# Patient Record
Sex: FEMALE | Race: WHITE | NOT HISPANIC OR LATINO | Employment: UNEMPLOYED | URBAN - METROPOLITAN AREA
[De-identification: names, ages, dates, MRNs, and addresses within clinical notes are randomized per-mention and may not be internally consistent; named-entity substitution may affect disease eponyms.]

---

## 2018-01-18 PROBLEM — M25.562 ACUTE PAIN OF LEFT KNEE: Status: ACTIVE | Noted: 2018-01-18

## 2018-02-12 PROBLEM — R26.2 DIFFICULTY WALKING: Status: ACTIVE | Noted: 2018-02-12

## 2018-02-12 PROBLEM — R29.898 WEAKNESS OF BOTH HIPS: Status: ACTIVE | Noted: 2018-02-12

## 2018-02-12 PROBLEM — M25.561 ACUTE PAIN OF BOTH KNEES: Status: ACTIVE | Noted: 2018-01-18

## 2018-02-12 PROBLEM — Z98.890 S/P ARTHROSCOPY OF RIGHT KNEE: Status: ACTIVE | Noted: 2018-02-12

## 2018-07-27 ENCOUNTER — HOSPITAL ENCOUNTER (OUTPATIENT)
Dept: RADIOLOGY | Facility: MEDICAL CENTER | Age: 43
End: 2018-07-27

## 2018-07-27 ENCOUNTER — TELEPHONE (OUTPATIENT)
Dept: PHYSICAL MEDICINE AND REHAB | Facility: MEDICAL CENTER | Age: 43
End: 2018-07-27

## 2018-07-27 ENCOUNTER — OFFICE VISIT (OUTPATIENT)
Dept: PHYSICAL MEDICINE AND REHAB | Facility: MEDICAL CENTER | Age: 43
End: 2018-07-27
Payer: MEDICAID

## 2018-07-27 VITALS
HEART RATE: 65 BPM | TEMPERATURE: 98.1 F | WEIGHT: 171.08 LBS | DIASTOLIC BLOOD PRESSURE: 78 MMHG | BODY MASS INDEX: 29.21 KG/M2 | OXYGEN SATURATION: 99 % | HEIGHT: 64 IN | SYSTOLIC BLOOD PRESSURE: 108 MMHG

## 2018-07-27 DIAGNOSIS — M79.2 NERVE PAIN: ICD-10-CM

## 2018-07-27 DIAGNOSIS — M25.562 PAIN IN BOTH KNEES, UNSPECIFIED CHRONICITY: ICD-10-CM

## 2018-07-27 DIAGNOSIS — M25.561 PAIN IN BOTH KNEES, UNSPECIFIED CHRONICITY: ICD-10-CM

## 2018-07-27 DIAGNOSIS — Z98.890 H/O KNEE SURGERY: ICD-10-CM

## 2018-07-27 PROCEDURE — 99204 OFFICE O/P NEW MOD 45 MIN: CPT | Performed by: PHYSICAL MEDICINE & REHABILITATION

## 2018-07-27 RX ORDER — OXYCODONE AND ACETAMINOPHEN 7.5; 325 MG/1; MG/1
TABLET ORAL
COMMUNITY
Start: 2018-07-06

## 2018-07-27 RX ORDER — TRAMADOL HYDROCHLORIDE 50 MG/1
TABLET ORAL
COMMUNITY
Start: 2018-05-18 | End: 2018-09-17

## 2018-07-27 RX ORDER — IBUPROFEN 800 MG/1
TABLET ORAL
COMMUNITY
Start: 2018-05-18

## 2018-07-27 ASSESSMENT — ENCOUNTER SYMPTOMS
NAUSEA: 0
EYE PAIN: 0
VOMITING: 0
SPUTUM PRODUCTION: 0
HEMOPTYSIS: 0
MYALGIAS: 1
PHOTOPHOBIA: 0
ORTHOPNEA: 0
TINGLING: 1
SENSORY CHANGE: 1
FEVER: 0
PALPITATIONS: 0
CHILLS: 0

## 2018-07-27 NOTE — PROGRESS NOTES
Subjective:      Nettie Isaacs is a 42 y.o. female who presents with New Patient    Chief complaint: Knee pain        HPI   The patient notes long history of knee pain, denies specific trauma at onset, but has been involved with some physical activities.  The patient tried conservative care, most recently underwent bilateral knee lateral releases in 1/2018, reviewed operative reports.  The patient notes ongoing knee pain, now with neuropathic component in the legs, worse with activities, limiting her ability to function.  The patient notes at follow-up with orthopedics, reports remaining option is total knee replacement, would like to delay due to patient's age.  The patient is inquiring about additional nonsurgical treatment options per    The patient notes no significant low back or hip area pain.  No significant neck pain noted.  No cranial nerve symptomatology noted.    The patient has had prior treatment with medications.  She has been to physical therapy, including postoperatively, reviewed records.  No bowel/bladder dysfunction noted.  No acute changes with strength noted.  She is making an effort with home exercise program.  The ongoing pain limits her ability to function.  She is inquiring about additional treatment options.      MEDICAL RECORDS REVIEW/DATA REVIEW: Reviewed in epic.    Records Reviewed: Reviewed referring provider notes.  Rheumatology evaluation 6/2018, no secondary rheumatologic condition identified.    I reviewed medications.  Pain/symptomatic medications per primary care provider.  Tried Lyrica and tramadol.     I reviewed  profile 7/26/2018.    I reviewed diagnostic studies:     I reviewed radiographs.  Reviewed MRI right knee 1/2018.  Reviewed MRI left knee 11/2017.  Reviewed left lower limb ultrasound 2/2018, unremarkable.    I reviewed lab studies.  Reviewed BMP/LFTs, CBC, 1/2018.  Reviewed rheumatologic screen 12/2017, unremarkable.    I reviewed medical issues.     I reviewed  family history: No neuromuscular disorders noted.    I reviewed social issues.  Homemaker      PAST MEDICAL HISTORY:   Past Medical History:   Diagnosis Date   • Garibay's esophagus    • IBD (inflammatory bowel disease)        PAST SURGICAL HISTORY:    Past Surgical History:   Procedure Laterality Date   • KNEE ARTHROSCOPY Left 1/25/2018    Procedure: RT KNEE ARTHROSCOPY, ARTHROTOMY WITH CHONDROPLASTY, OPEN LATERAL RELEASE,  and left knee aspiration;  Surgeon: Brandon Wei M.D.;  Location: SURGERY [facility];  Service: Orthopedics   • KNEE ARTHROSCOPY Left 1/5/2018    Procedure: LT KNEE ARTHROSCOPY debreadment,  Arthrotomy, Chondroplasty and Lateral Release;  Surgeon: Brandon Wei M.D.;  Location: SURGERY [facility];  Service: Orthopedics   • APPENDECTOMY     • BOWEL RESECTION LAPAROSCOPIC      IUD caused bowel obstruction   • CHOLECYSTECTOMY     • TONSILLECTOMY AND ADENOIDECTOMY     • TUBAL COAGULATION LAPAROSCOPIC BILATERAL         ALLERGIES:  Pcn [penicillins]    MEDICATIONS:    Outpatient Encounter Prescriptions as of 7/27/2018   Medication Sig Dispense Refill   • oxyCODONE-acetaminophen (PERCOCET) 7.5-325 MG per tablet      • dicyclomine (BENTYL) 20 MG Tab Take 20 mg by mouth. Prn only     • tramadol (ULTRAM) 50 MG Tab      • LYRICA 75 MG Cap      •  MG Tab      • [DISCONTINUED] oxycodone-acetaminophen (PERCOCET) 5-325 MG Tab Take 1-2 Tabs by mouth every four hours as needed for Moderate Pain.     • [DISCONTINUED] ibuprofen (MOTRIN) 600 MG Tab Take 1 Tab by mouth every 8 hours as needed for up to 30 doses. 30 Tab 0     No facility-administered encounter medications on file as of 7/27/2018.        SOCIAL HISTORY:    Social History     Social History   • Marital status:      Spouse name: N/A   • Number of children: N/A   • Years of education: N/A     Social History Main Topics   • Smoking status: Former Smoker     Quit date: 3/4/2009   • Smokeless tobacco: Never Used   •  "Alcohol use Yes      Comment: 1 glass of wine once a month   • Drug use: No   • Sexual activity: Not on file     Other Topics Concern   •  Service No   • Blood Transfusions No   • Caffeine Concern No   • Occupational Exposure No   • Hobby Hazards No   • Sleep Concern No   • Stress Concern No   • Weight Concern No   • Special Diet Yes   • Back Care No   • Exercise Yes   • Bike Helmet No   • Seat Belt Yes   • Self-Exams Yes     Social History Narrative   • No narrative on file       Review of Systems   Constitutional: Negative for chills and fever.   HENT: Negative for ear pain and tinnitus.    Eyes: Negative for photophobia and pain.   Respiratory: Negative for hemoptysis and sputum production.    Cardiovascular: Negative for palpitations and orthopnea.   Gastrointestinal: Negative for nausea and vomiting.   Genitourinary: Negative for frequency and urgency.   Musculoskeletal: Positive for joint pain and myalgias.   Skin: Negative.    Neurological: Positive for tingling and sensory change.   All other systems reviewed and are negative.        Objective:     /78   Pulse 65   Temp 36.7 °C (98.1 °F)   Ht 1.626 m (5' 4\")   Wt 77.6 kg (171 lb 1.2 oz)   LMP 07/26/2018   SpO2 99%   BMI 29.37 kg/m²      Physical Exam  Constitutional: oriented to person, place, and time, appears well-developed and well-nourished.   HEENT: Normocephalic atraumatic, neck supple, no JVD noted, no masses noted, no meningeal signs noted  Lymphadenopathy: no cervical, supraclavicular, or inguinal lymphadenopathy noted  Cardiovascular: Intact distal pulses, including at wrists and ankles, no limb swelling noted  Pulmonary: No tachypnea noted, no accessory muscle use noted, no dyspnea noted  Abdominal: Soft, nontender, exhibits no distension, no peritoneal signs, no HSM  Musculoskeletal:   Hip: No significant tenderness, no significant pain with range of motion testing  Lumbar back: No significant tenderness, no significant pain " with range of motion testing  Knee: Tender with palpation about bilateral knees, pain with range of motion testing, healed knee scars anterior lateral aspect, tender with palpation regions, knee stable with stress testing, no signs of infection, no significant effusion noted.  Neurological: oriented to person, place, and time. Cranial nerves grossly intact, normal strength. Sensation intact distally. Reflexes 1+ in  lower limbs, Gait antalgic, reciprocal, No upper motor neuron signs evident  Skin: Skin is intact. no rashes or lesions noted  Psychiatric: normal mood and affect. speech is normal and behavior is normal. Judgment and thought content normal.         Assessment/Plan:       ASSESSMENT:    1.  Bilateral knee pain, sprain strain, arthritis, history of bilateral lateral releases 1/2018, history of chondromalacia patella, osteochondral defects, persisting pain following surgery, neuropathic component, consider bony versus soft tissue pathology versus neuropathic etiology    - DX-KNEE COMPLETE 4+ LEFT; Future  - DX-KNEE COMPLETE 4+ RIGHT; Future  - MR-KNEE-W/O LEFT; Future  - MR-KNEE-W/O RIGHT; Future    2.  Comorbid medical issues, including Garibay's, IBS, with care per primary care provider      DISCUSSION/PLAN:    - I discussed management options. I reviewed symptomatic care    - I would like to obtain/review additional records, including most recent records from orthopedics, Dr. Wei, requested    - I reviewed home exercise program, with medical precautions    - The patient can consider complementary trials with acupuncture or TENS unit    - I reviewed medication monitoring.  Pain/symptomatic medications per primary care provider. I reviewed further symptomatic medications.  I did not prescribe any medications today.    - I reviewed additional diagnostic options, including further/advanced imaging, electrodiagnostic testing, bone scan, vascular studies, and further lab screen    - I reviewed additional  therapeutic options, including injection/interventional therapy and additional consultative input    - I reviewed psychosocial interventions    - Return after the above-noted diagnostic studies or an as-needed basis      Please note that this dictation was created using voice recognition software. I have made every reasonable attempt to correct obvious errors but there may be errors of grammar and content that I may have overlooked prior to finalization of this note.

## 2018-07-27 NOTE — TELEPHONE ENCOUNTER
----- Message from Valentin Haji M.D. sent at 7/27/2018  1:20 PM PDT -----  From [facility], have MRI knees pushed in, we already have reports.    MRI right knee performed on 1/2018, MRI left knee performed on 11/2017.

## 2018-08-06 ENCOUNTER — HOSPITAL ENCOUNTER (OUTPATIENT)
Dept: RADIOLOGY | Facility: MEDICAL CENTER | Age: 43
End: 2018-08-06
Attending: PHYSICAL MEDICINE & REHABILITATION
Payer: MEDICAID

## 2018-08-06 DIAGNOSIS — M25.562 PAIN IN BOTH KNEES, UNSPECIFIED CHRONICITY: ICD-10-CM

## 2018-08-06 DIAGNOSIS — Z98.890 H/O KNEE SURGERY: ICD-10-CM

## 2018-08-06 DIAGNOSIS — M25.561 PAIN IN BOTH KNEES, UNSPECIFIED CHRONICITY: ICD-10-CM

## 2018-08-06 PROCEDURE — 73721 MRI JNT OF LWR EXTRE W/O DYE: CPT | Mod: RT

## 2018-08-06 PROCEDURE — 73564 X-RAY EXAM KNEE 4 OR MORE: CPT | Mod: RT

## 2018-08-06 PROCEDURE — 73564 X-RAY EXAM KNEE 4 OR MORE: CPT | Mod: LT

## 2018-08-06 PROCEDURE — 73721 MRI JNT OF LWR EXTRE W/O DYE: CPT | Mod: LT

## 2018-09-17 ENCOUNTER — OFFICE VISIT (OUTPATIENT)
Dept: PHYSICAL MEDICINE AND REHAB | Facility: MEDICAL CENTER | Age: 43
End: 2018-09-17
Payer: MEDICAID

## 2018-09-17 VITALS
BODY MASS INDEX: 28.94 KG/M2 | DIASTOLIC BLOOD PRESSURE: 78 MMHG | HEART RATE: 64 BPM | TEMPERATURE: 97.5 F | SYSTOLIC BLOOD PRESSURE: 114 MMHG | WEIGHT: 169.53 LBS | HEIGHT: 64 IN | OXYGEN SATURATION: 98 %

## 2018-09-17 DIAGNOSIS — M22.2X2 PATELLOFEMORAL PAIN SYNDROME OF BOTH KNEES: ICD-10-CM

## 2018-09-17 DIAGNOSIS — M25.569 CHRONIC KNEE PAIN, UNSPECIFIED LATERALITY: ICD-10-CM

## 2018-09-17 DIAGNOSIS — G89.29 CHRONIC KNEE PAIN, UNSPECIFIED LATERALITY: ICD-10-CM

## 2018-09-17 DIAGNOSIS — S89.90XA: ICD-10-CM

## 2018-09-17 DIAGNOSIS — Z98.890 H/O KNEE SURGERY: ICD-10-CM

## 2018-09-17 DIAGNOSIS — M22.2X1 PATELLOFEMORAL PAIN SYNDROME OF BOTH KNEES: ICD-10-CM

## 2018-09-17 DIAGNOSIS — M22.40 CHONDROMALACIA OF PATELLA, UNSPECIFIED LATERALITY: ICD-10-CM

## 2018-09-17 PROCEDURE — 99214 OFFICE O/P EST MOD 30 MIN: CPT | Performed by: PHYSICAL MEDICINE & REHABILITATION

## 2018-09-17 NOTE — PROGRESS NOTES
Subjective:      Nettie Isaacs presents with Follow-Up        Subjective: Mr. Isaacs returns to the office today for follow-up evaluation of spinal/joint/musculoskeletal pain.    The patient long history of knee pain, denies specific trauma at onset, but has been involved with some physical activities.  The patient tried conservative care, most recently underwent bilateral knee lateral releases in 1/2018, reviewed operative reports.  Reviewed orthopedic follow-up, most recently from 6/2018, recommending continued nonsurgical care, consideration of injection therapy.    The patient notes ongoing knee pain, now with neuropathic component in the legs, worse with activities, limiting her ability to function, including standing and walking tolerance    She notes compensatory hip area pain per    She notes intermittent lumbosacral pain, controlled, without radicular component. No cranial nerve symptomatology noted.    The patient has had prior treatment with medications.  She has been to physical therapy, including postoperatively, reviewed records.  No bowel/bladder dysfunction noted.  No acute changes with strength noted.  She is making an effort with home exercise program.  The ongoing pain limits her ability to function.  She is inquiring about additional treatment options.      MEDICAL RECORDS REVIEW/DATA REVIEW: Reviewed in epic.    I reviewed medications.  Pain/symptomatic medications per primary care provider.  Notes pending trial with Lyrica.  Previously tried tramadol, without benefit or tolerated.    Reviewed  profile 9/17/2018.    I reviewed diagnostic studies:     I reviewed radiographs.       Reviewed MRI left knee 8/2018, see below.  Reviewed left knee x-rays 8/2018, see below.    Reviewed MRI right knee 8/2018, see below.  Reviewed right knee x-rays 8/2018, see below.    Reviewed left lower limb ultrasound 2/2018, unremarkable.    I reviewed lab studies.  Reviewed BMP/LFTs, CBC, 1/2018.  Reviewed  rheumatologic screen 12/2017, unremarkable.    I reviewed medical issues.  Followed by primary care provider.  Rheumatology evaluation 6/2018, no secondary rheumatologic condition identified.    I reviewed family history: No neuromuscular disorders noted.    I reviewed social issues.  Homemaker      8/6/2018 1:00 PM    HISTORY/REASON FOR EXAM:  Knee pain no trauma  BILATERAL LATERAL RELEASE IN January, CONTINUED PAIN AND INSTABILITY    TECHNIQUE/EXAM DESCRIPTION:  MRI of the LEFT knee without contrast.    The study was performed on a [employer] Signa 1.5 Tierra MRI scanner. T1 coronal, proton density fat-suppressed coronal, fast spin-echo T2 fat-suppressed axial, intermediate fast spin-echo sagittal, and intermediate fast spin-echo fat-suppressed thin-section   sagittal images were obtained.    COMPARISON: None    FINDINGS:    Joint fluid: Small knee joint effusion.  Popliteal cyst: None.    Medial meniscus: Intact.    Lateral meniscus: Intact.    Anterior cruciate ligament: Continuous with normal signal    Posterior cruciate ligament: Continuous with normal signal    Medial collateral ligament: Intact.    Lateral collateral stabilizing complex: The lateral collateral ligament, IT band, biceps femoris, popliteus tendon and popliteofibular ligament are intact.    Medial retinaculum and medial patellofemoral ligament: Intact.    Lateral retinaculum: Postsurgical change.    Extensor mechanism: Intact.    Patellofemoral compartment: Full-thickness cartilage loss in the median and lateral patellar. High-grade fissure and near full-thickness cartilage loss in the trochlea cartilage.    There is a focal area of abnormal signal intensity in the superolateral aspect of Hoffa fat pad , may represent patellar tendon-lateral femoral condyle friction syndrome.    Medial compartment: Diffuse cartilage thinning and heterogeneity    Lateral compartment: Diffuse cartilage thinning and heterogeneity. No full-thickness cartilage  loss.    Bone marrow: Within normal limits. No acute fracture.    __________________________________   Impression         1. Postsurgical change in the lateral retinaculum. Intact cruciate and collateral ligaments.    2. Intact anterior horn, body, posterior horn of the medial and lateral menisci.    3. Severe chondrosis of the patellofemoral compartment first full thickness cartilage loss in the patella.    4. There is a focal area of abnormal signal intensity in the superolateral aspect of Hoffa fat pad , may represent patellar tendon-lateral femoral condyle friction syndrome.       8/6/2018 2:24 PM    HISTORY/REASON FOR EXAM:  Atraumatic Pain/Swelling/Deformity  left knee pain, patella dislocation that continues despite lateral release 8 mos ago    TECHNIQUE/EXAM DESCRIPTION AND NUMBER OF VIEWS:  4 views of the LEFT knee.    COMPARISON: None    FINDINGS:  No acute fracture or dislocation.    Tricompartmental chondrosis, most in the medial and patellofemoral compartments.    Small knee joint effusion.   Impression         1. No acute osseous abnormality.       8/6/2018 1:00 PM    HISTORY/REASON FOR EXAM:  Knee pain no trauma  HAD BILATERAL LATERAL RELEASE IN January, CONTINUED PAIN AND INSTABILITY    TECHNIQUE/EXAM DESCRIPTION:  MRI of the RIGHT knee without contrast.    The study was performed on a [employer] Signa 1.5 Tierra MRI scanner. T1 coronal, proton density fat-suppressed coronal, fast spin-echo T2 fat-suppressed axial, intermediate fast spin-echo sagittal, and intermediate fast spin-echo fat-suppressed thin-section   sagittal images were obtained.    COMPARISON: None.    FINDINGS:    Joint fluid: Small knee joint effusion.  Popliteal cyst: None.    Medial meniscus: Intact.    Lateral meniscus: Intact.    Anterior cruciate ligament: Continuous with normal signal    Posterior cruciate ligament: Continuous with normal signal    Medial collateral ligament: Intact.    Lateral collateral stabilizing complex: The  lateral collateral ligament, IT band, biceps femoris, popliteus tendon and popliteofibular ligament are intact.    Medial retinaculum and medial patellofemoral ligament: Intact.    Lateral retinaculum: Postsurgical change.    Extensor mechanism: Intact.    Patellofemoral compartment: Extensive cartilage loss throughout the patellar facet, most in the lateral patellar facet with full-thickness cartilage loss and subchondral cystic change.    Scarring and thickening in the Hoffa fat pad.    Extensive full-thickness cartilage loss in the lateral trochlea.    Medial compartment: Small high-grade fissure in the weightbearing femoral condyle    Lateral compartment: Cartilage is maintained.    Bone marrow: Within normal limits. No acute fracture.    __________________________________   Impression         1. Postsurgical change in the lateral retinaculum. Intact cruciate and collateral ligaments.    2. Intact anterior horn, body, posterior horn of the medial and lateral menisci.    3. Severe chondrosis of the lateral patellofemoral compartment with full-thickness cartilage loss in the patella and trochlea.    4. Scarring and thickening in the Hoffa fat pad.       8/6/2018 2:23 PM    HISTORY/REASON FOR EXAM:  Atraumatic Pain/Swelling/Deformity      TECHNIQUE/EXAM DESCRIPTION AND NUMBER OF VIEWS:  4 views of the RIGHT knee.    COMPARISON: None    FINDINGS:  No acute fracture or dislocation.    Tricompartmental osteoarthritis    Small knee joint effusion.   Impression         1. No acute osseous abnormality.       PAST MEDICAL HISTORY:   Past Medical History:   Diagnosis Date   • Garibay's esophagus    • IBD (inflammatory bowel disease)        PAST SURGICAL HISTORY:    Past Surgical History:   Procedure Laterality Date   • KNEE ARTHROSCOPY Left 1/25/2018    Procedure: RT KNEE ARTHROSCOPY, ARTHROTOMY WITH CHONDROPLASTY, OPEN LATERAL RELEASE,  and left knee aspiration;  Surgeon: Brandon Wei M.D.;  Location: SURGERY [place]  [facility];  Service: Orthopedics   • KNEE ARTHROSCOPY Left 1/5/2018    Procedure: LT KNEE ARTHROSCOPY debreadment,  Arthrotomy, Chondroplasty and Lateral Release;  Surgeon: Brandon Wei M.D.;  Location: SURGERY [facility];  Service: Orthopedics   • APPENDECTOMY     • BOWEL RESECTION LAPAROSCOPIC      IUD caused bowel obstruction   • CHOLECYSTECTOMY     • TONSILLECTOMY AND ADENOIDECTOMY     • TUBAL COAGULATION LAPAROSCOPIC BILATERAL         ALLERGIES:  Pcn [penicillins]    MEDICATIONS:    Outpatient Encounter Prescriptions as of 9/17/2018   Medication Sig Dispense Refill   • oxyCODONE-acetaminophen (PERCOCET) 7.5-325 MG per tablet      •  MG Tab      • dicyclomine (BENTYL) 20 MG Tab Take 20 mg by mouth. Prn only     • LYRICA 75 MG Cap      • [DISCONTINUED] tramadol (ULTRAM) 50 MG Tab        No facility-administered encounter medications on file as of 9/17/2018.        SOCIAL HISTORY:    Social History     Social History   • Marital status:      Spouse name: N/A   • Number of children: N/A   • Years of education: N/A     Social History Main Topics   • Smoking status: Former Smoker     Quit date: 3/4/2009   • Smokeless tobacco: Never Used   • Alcohol use Yes      Comment: 1 glass of wine once a month   • Drug use: No   • Sexual activity: Not on file     Other Topics Concern   •  Service No   • Blood Transfusions No   • Caffeine Concern No   • Occupational Exposure No   • Hobby Hazards No   • Sleep Concern No   • Stress Concern No   • Weight Concern No   • Special Diet Yes   • Back Care No   • Exercise Yes   • Bike Helmet No   • Seat Belt Yes   • Self-Exams Yes     Social History Narrative   • No narrative on file       Review of Systems Reviewed, no changes noted  Constitutional: Negative for chills and fever.   HENT: Negative for ear pain and tinnitus.    Eyes: Negative for photophobia and pain.   Respiratory: Negative for hemoptysis and sputum production.    Cardiovascular: Negative  "for palpitations and orthopnea.   Gastrointestinal: Negative for nausea and vomiting.   Genitourinary: Negative for frequency and urgency.   Musculoskeletal: Positive for joint pain and myalgias.   Skin: Negative.    Neurological: Positive for tingling and sensory change.   All other systems reviewed and are negative.        Objective:     /78   Pulse 64   Temp 36.4 °C (97.5 °F)   Ht 1.626 m (5' 4\")   Wt 76.9 kg (169 lb 8.5 oz)   LMP 09/16/2018   SpO2 98%   BMI 29.10 kg/m²      Physical Exam  Constitutional: oriented to person, place, and time, appears well-developed and well-nourished.   HEENT: Normocephalic atraumatic, neck supple, no JVD noted, no masses noted, no meningeal signs noted  Lymphadenopathy: no cervical, supraclavicular, or inguinal lymphadenopathy noted  Cardiovascular: Intact distal pulses, including at wrists and ankles, no limb swelling noted  Pulmonary: No tachypnea noted, no accessory muscle use noted, no dyspnea noted  Abdominal: Soft, nontender, exhibits no distension, no peritoneal signs, no HSM  Musculoskeletal:   Right hip: exhibits mild tenderness. Minimal pain with range of motion testing  Left hip: exhibits mild tenderness. Minimal pain with range of motion testing  Lumbar back: exhibits only mild decreased range of motion, only mild tenderness and only mild pain. negative straight leg testing, trigger points noted  Neurological: oriented to person, place, and time. Cranial nerves grossly intact, normal strength. Sensation intact distally. Reflexes 1+ in lower limbs, Gait mildly antalgic, reciprocal, no upper motor neuron signs evident  Skin: Skin is intact. no rashes or lesions noted  Psychiatric: normal mood and affect. speech is normal and behavior is normal. Judgment and thought content normal.         Assessment/Plan:       ASSESSMENT:    1.  Bilateral knee pain, sprain strain, arthritis, history of bilateral lateral releases 1/2018, patellofemoral pain, " chondrosis/cartilage pathology, chondromalacia patella, osteochondral defect    - Submitted second opinion orthopedic surgery consultation, tertiary center, evaluate surgical options  - Reviewed/offered injection therapy    2.  Hip and lumbosacral area pain, sprain strain, compensatory    3.  Medication management    - Reviewed provisions of facility controlled substance prescribing program, patient to consider, continuing with medication management per primary care provider for now    4.  Comorbid medical issues, including Garibay's, IBS, with care per primary care provider      DISCUSSION/PLAN:    - I discussed management options. I reviewed symptomatic care    - I would like to obtain/review additional records, including most recent records from orthopedics, Dr. Wei, requested    - I reviewed home exercise program, with medical precautions    - The patient can consider complementary trials with acupuncture or TENS unit    - I reviewed medication monitoring.  Pain/symptomatic medications per primary care provider. I reviewed further symptomatic medications.  I did not prescribe any medications today.    - I reviewed additional diagnostic options, including further/advanced imaging, electrodiagnostic testing, bone scan, vascular studies, and further lab screen    - I reviewed additional therapeutic options, including injection/interventional therapy and additional consultative input    - I reviewed psychosocial interventions    - Return after the above-noted consultation or an as-needed basis      Please note that this dictation was created using voice recognition software. I have made every reasonable attempt to correct obvious errors but there may be errors of grammar and content that I may have overlooked prior to finalization of this note.